# Patient Record
Sex: MALE | Race: WHITE | Employment: FULL TIME | URBAN - METROPOLITAN AREA
[De-identification: names, ages, dates, MRNs, and addresses within clinical notes are randomized per-mention and may not be internally consistent; named-entity substitution may affect disease eponyms.]

---

## 2021-03-09 ENCOUNTER — OFFICE VISIT (OUTPATIENT)
Dept: URGENT CARE | Facility: URGENT CARE | Age: 36
End: 2021-03-09

## 2021-03-09 VITALS
OXYGEN SATURATION: 97 % | WEIGHT: 220 LBS | HEART RATE: 69 BPM | SYSTOLIC BLOOD PRESSURE: 129 MMHG | RESPIRATION RATE: 16 BRPM | DIASTOLIC BLOOD PRESSURE: 85 MMHG | TEMPERATURE: 98.5 F

## 2021-03-09 DIAGNOSIS — M25.561 ACUTE PAIN OF RIGHT KNEE: Primary | ICD-10-CM

## 2021-03-09 PROCEDURE — 99203 OFFICE O/P NEW LOW 30 MIN: CPT | Performed by: PHYSICIAN ASSISTANT

## 2021-03-09 SDOH — HEALTH STABILITY: MENTAL HEALTH: HOW OFTEN DO YOU HAVE A DRINK CONTAINING ALCOHOL?: NEVER

## 2021-03-09 NOTE — PROGRESS NOTES
Patient presents with:  Work Comp: right knee pain- front but worsened in the back of knee. Leg weakness- sx worsened as day goes on. Sx started Feb 27 at work does not recall any injuries job is physical active- pain came on grradually and then worsened with time.       (M25.561) Acute pain of right knee  (primary encounter diagnosis)  Comment:   Plan: ibuprofen as needed.      Follow up with orthopedics should symptoms persist or worsen.       SUBJECTIVE:   Lesa Jarvis is a 35 year old male    Works from 6pm-2am.  [place] discomfort in his right knee while working on 2/26/21.  Pain worse on 2/27/21.  He mentioned this to his manager on 3/2/21.    He does do a lot of lifting at work.        History reviewed. No pertinent past medical history.         Current Outpatient Medications   Medication Sig Dispense Refill     Multiple Vitamins-Iron (DAILY-LISA/IRON/BETA-CAROTENE) TABS TAKE 1 TABLET BY MOUTH DAILY. (Patient not taking: Reported on 10/19/2020) 30 tablet 7     Social History     Tobacco Use     Smoking status: Never Smoker     Smokeless tobacco: Never Used   Substance Use Topics     Alcohol use: Not on file     Family History   Problem Relation Age of Onset     Diabetes Mother      Diabetes Father          ROS:    10 point ROS of systems including Constitutional, Eyes, Respiratory, Cardiovascular, Gastroenterology, Genitourinary, Integumentary, Muscularskeletal, Psychiatric ,neurological were all negative except for pertinent positives noted in my HPI       OBJECTIVE:  /85   Pulse 69   Temp 98.5  F (36.9  C) (Oral)   Resp 16   Wt 99.8 kg (220 lb)   SpO2 97%   Physical Exam:  GENERAL APPEARANCE: healthy, alert and no distress  Extremities: right knee: joint is stable.  Tenderness with palpation - generalized.  No warmth or erythema  SKIN: no suspicious lesions or rashes

## 2021-03-09 NOTE — PATIENT INSTRUCTIONS
Patient Education       (M25.230) Acute pain of right knee  (primary encounter diagnosis)  Comment:   Plan: ibuprofen as needed.        Knee Pain  Knee pain is very common. It s especially common in active people who put a lot of pressure on their knees, like runners. It affects women more often than men.  Your kneecap (patella) is a thick, round bone. It covers and protects the front portion of your knee joint. It moves along a groove in your thighbone (femur) as part of the patellofemoral joint. A layer of cartilage surrounds the underside of your kneecap. This layer protects it from grinding against your femur.  When this cartilage softens and breaks down, it can cause knee pain. This is partly because of repetitive stress. The stress irritates the lining of the joint. This causes pain in the underlying bone.  What causes knee pain?  Many things can cause knee pain. You may have more than one cause. Some of these include:    Overuse of the knee joint    The kneecap doesn t line up with the tissue around it    Damage to small nerves in the area    Damage to the ligament-like structure that holds the kneecap in place (retinaculum)    Breakdown of the bone under the cartilage    Swelling in the soft tissues around the kneecap    Injury  You might be more likely to have knee pain if you:    Exercise a lot    Recently increased the intensity of your workouts    Have a body mass index (BMI) greater than 25    Have poor alignment of your kneecap    Walk with your feet turned overly outward or inward    Have weakness in surrounding muscle groups (inner quad or hip adductor muscles)    Have too much tightness in surrounding muscle groups (hamstrings or iliotibial band)    Have a recent history of injury to the area    Are female  Symptoms of knee pain  This type of knee pain is a dull, aching pain in the front of the knee in the area under and around the kneecap. This pain may start quickly or slowly. Your pain might be  worse when you squat, run, or sit for a long time. Climbing stairs may be painful or hard to do. You might also sometimes feel like your knee is giving out. You may have symptoms in one or both of your knees.  Diagnosing knee pain  Your healthcare provider will ask about your medical history and your symptoms. Be sure to describe any activities that make your knee pain worse. He or she will look at your knee. This will include tests of your range of motion, strength, and areas of pain of your knee. Your knee alignment will be checked.  Your healthcare provider will need to rule out other causes of your knee pain, such as arthritis. You may need an imaging test, such as an X-ray or MRI.  Treatment for knee pain  Treatments that can help ease your symptoms may include:    Avoiding activities for a while that make your pain worse, returning to activity over time    Icing the outside of your knee when it causes you pain    Taking over-the-counter pain medicine such as NSAIDs    Wearing a knee brace or taping your knee to support it    Compression to help prevent swelling    Wearing special shoe inserts to help keep your feet in the proper alignment    Elevating your knee    Doing special exercises to stretch and strengthen the muscles around your hip and your knee  These steps help most people manage knee pain. But some cases of knee pain need to be treated with surgery. You rarely need surgery right away. You may need it later if other treatments don t work. Your healthcare provider may refer you to an orthopedic surgeon. He or she will talk with you about your choices.  Preventing knee pain  Losing weight and correcting excess muscle tightness or muscle weakness may help lower your risk.  In some cases, you can prevent knee pain. To help prevent a flare-up of knee pain, do these things:    Regularly do all the exercises your doctor or physical therapist advises    Warm up fully before exercising    Support your knee  as advised by your doctor or physical therapist    Increase training gradually, and ease up on training when needed    Have an expert check your gait for running or other sporting activities    Stretch properly before and after exercise    Replace your running shoes regularly    Lose excess weight  When to call your healthcare provider  Call your healthcare provider right away if:    Your symptoms don t get better after a few weeks of treatment    You have any new symptoms  Kirk last reviewed this educational content on 6/1/2019 2000-2020 [employer]. All rights reserved. This information is not intended as a substitute for professional medical care. Always follow your healthcare professional's instructions.

## 2021-03-09 NOTE — NURSING NOTE
Vital signs:  Temp: 98.5  F (36.9  C) Temp src: Oral BP: 129/85 Pulse: 69   Resp: 16 SpO2: 97 %       Weight: 99.8 kg (220 lb)  There is no height or weight on file to calculate BMI.

## 2021-03-19 ENCOUNTER — OFFICE VISIT (OUTPATIENT)
Dept: URGENT CARE | Facility: URGENT CARE | Age: 36
End: 2021-03-19

## 2021-03-19 ENCOUNTER — ANCILLARY PROCEDURE (OUTPATIENT)
Dept: GENERAL RADIOLOGY | Facility: CLINIC | Age: 36
End: 2021-03-19
Attending: FAMILY MEDICINE

## 2021-03-19 VITALS
SYSTOLIC BLOOD PRESSURE: 116 MMHG | TEMPERATURE: 97.6 F | OXYGEN SATURATION: 100 % | WEIGHT: 223 LBS | HEART RATE: 58 BPM | BODY MASS INDEX: 31.92 KG/M2 | HEIGHT: 70 IN | DIASTOLIC BLOOD PRESSURE: 74 MMHG

## 2021-03-19 DIAGNOSIS — S83.422A SPRAIN OF LATERAL COLLATERAL LIGAMENT OF LEFT KNEE, INITIAL ENCOUNTER: ICD-10-CM

## 2021-03-19 DIAGNOSIS — Z02.6 ENCOUNTER RELATED TO WORKER'S COMPENSATION CLAIM: ICD-10-CM

## 2021-03-19 DIAGNOSIS — M25.562 ACUTE PAIN OF LEFT KNEE: Primary | ICD-10-CM

## 2021-03-19 PROCEDURE — 73562 X-RAY EXAM OF KNEE 3: CPT | Mod: LT | Performed by: RADIOLOGY

## 2021-03-19 PROCEDURE — 99214 OFFICE O/P EST MOD 30 MIN: CPT | Performed by: FAMILY MEDICINE

## 2021-03-19 ASSESSMENT — MIFFLIN-ST. JEOR: SCORE: 1947.77

## 2021-03-19 NOTE — PROGRESS NOTES
Chief Complaint   Patient presents with     Knee Pain     left-happened yesterday-felt and heard knee pop-pain with movement       Medical Decision Making:    ASSESMENT AND PLAN     Lesa was seen today for knee pain.    Diagnoses and all orders for this visit:    Acute pain of left knee  -     XR Knee Left 3 Views    Encounter related to worker's compensation claim    Sprain of lateral collateral ligament of left knee, initial encounter            Tylenol, Ibuprofen and Rest  Patient was suggested to wear knee brace/knee sleeve as patient already has continue doing light duty activities patient was given a note for his work to continue light duty activities for next 1 week  Suggested if notices any worsening of the pain should follow-up for further evaluation and treatment  Reviewed with patient the x-ray results      Differential Diagnosis:  MS Injury Pain: sprain, fracture, tendonitis, muscle strain, contusion and dislocation      See orders in Epic  Pt verbalized and agreed with the plan and is aware of the worsening symptoms for which would need to follow up .  Pt was stable during time of discharge from the clinic     X-Ray was done, my findings are: no fracture or joint  space narrowing noted     Review of the result(s) of each unique test -     Time  spent on the date of the encounter doing chart review, review of test results, interpretation of tests, patient visit and documentation     If not improving or if condition worsens, follow up with your Primary Care Provider    SUBJECTIVE     Lesa Jarvis is a 35 year old male presenting with a chief complaint of    Chief Complaint   Patient presents with     Knee Pain     left-happened yesterday-felt and heard knee pop-pain with movement     MS Injury/Pain    Onset of symptoms was 1 day(s) ago.while at work he was working with a heavy equipment at work and had to stand on a platform attached to the heavy equipment and had to pull the liver while doing so he had to  "make a twisting movement of the hip and the knee and while doing so felt a pop in the left knee he describes the pain is mild but he has a feeling that the knee tends to get locked when he has this pain.  He denies any history of similar injury in the past.  Location: left knee  Context:       The injury happened while at work      Mechanism: twisting      Patient experienced immediate pain, delayed pain  Course of symptoms is waxing and waning.    Severity moderate  Current and Associated symptoms: Pain, Tenderness, Decreased range of motion and Stiffness  Denies  Warmth and Redness  Aggravating Factors: walking and flexion/extension  Therapies to improve symptoms include: ice  This is the first time this type of problem has occurred for this patient.           History reviewed. No pertinent past medical history.  No current outpatient medications on file.     Social History     Tobacco Use     Smoking status: Never Smoker     Smokeless tobacco: Never Used   Substance Use Topics     Alcohol use: Never     Frequency: Never     History reviewed. No pertinent family history.      ROS:    10 point ROS of systems including Constitutional, Eyes, Respiratory, Cardiovascular, Gastroenterology, Genitourinary, Integumentary,  Psychiatric ,neurological were all negative except for pertinent positives noted in my HPI         OBJECTIVE:    /74   Pulse 58   Temp 97.6  F (36.4  C) (Tympanic)   Ht 1.778 m (5' 10\")   Wt 101.2 kg (223 lb)   SpO2 100%   BMI 32.00 kg/m    GENERAL APPEARANCE: healthy, alert and no distress  EYES: EOMI,  PERRL, conjunctiva clear  NEURO: Normal strength and tone, sensory exam grossly normal,  normal speech and mentation  PSYCH: mentation appears normal  KNEE: The injured knee reveals soft tissue tenderness over lateral collateral ligament, reduced range of motion, normal contralateral knee exam. X-ray is negative for fracture.    Physical Exam      (Note was completed, in part, with Mor[employer] " voice-recognition software. Documentation reviewed, but some grammatical, spelling, and word errors may remain.)  Ariane Jett MD

## 2021-03-19 NOTE — PATIENT INSTRUCTIONS
Patient Education     Knee Sprain of the Collateral Ligaments  The knee is a hinge joint supported by four strong ligaments. The two ligaments inside the knee protect this joint from excess forward and backward movement. The ligaments on the outside of the joint prevent side-to-side motion. These are called the collateral ligaments.  The medial collateral ligament is located on the inner side of the joint; and the lateral collateral ligament is on the outer side of the joint.  A sprain is a tearing of a ligament. The tear may be partial or complete. Diagnosis is made by physical exam. In the case of an acute injury, the knee may be too swollen or painful to examine fully. A more accurate exam can be done after the initial swelling goes down.  Symptoms of a knee sprain include immediate knee swelling, pain, and difficulty walking. Initial treatment includes rest, splinting the knee to reduce movement of the joint, and icing the knee to reduce swelling and pain. You may use over-the-counter pain medicine to control pain, unless another medicine was prescribed. Most sprains will heal in 3 to 6 weeks. A severe injury can take 3 to 4 months to heal. You may also need rehab exercises. Surgery is usually not required for sprains involving only the collateral ligaments.  Home care    Stay off the injured leg as much as possible until you can walk on it without pain. If you have a lot of pain while walking, crutches or a walker may be prescribed. These can be rented or purchased at many pharmacies and surgical or orthopedic supply stores. Follow your healthcare provider s advice about when to begin bearing weight on that leg.     If you were given a hook-and-loop closure knee brace, you can remove it to bathe. But leave it in place when walking, sitting, or lying down unless told otherwise.    Apply an ice pack over the injured area for 15 to 20 minutes every 3 to 6 hours. You should do this for the first 24 to 48  hours. You can make an ice pack by filling a plastic bag that seals at the top with ice cubes and then wrapping it with a thin towel. Continue to use ice packs for relief of pain and swelling as needed. As the ice melts, be careful to avoid getting your wrap, splint, or cast wet. You can place the ice pack directly over the splint. If you have to wear a hook-and-loop knee brace, you can open it to apply the ice pack, or heat, directly to the knee. Never put ice directly on the skin. Always wrap the ice in a towel or other type of cloth.    You may use over-the-counter pain medicine to control pain, unless another pain medicine was prescribed. Anti-inflammatory pain medicines, such as ibuprofen or naproxen may be more effective than acetaminophen. If you have chronic liver or kidney disease or ever had a stomach ulcer or gastrointestinal bleeding, talk with your healthcare provider before using these medicines.  Follow-up care  Follow up with your healthcare provider as advised. Any X-rays you had today don t show any broken bones, breaks, or fractures. Sometimes fractures don t show up on the first X-ray. Bruises and sprains can sometimes hurt as much as a fracture. These injuries can take time to heal completely. If your symptoms don t improve or they get worse, talk with your healthcare provider. You may need a repeat X-ray or other tests such as MRI. If X-rays were taken, you will be told of any new findings that may affect your care.  Call 911  Call 911 if you have:     Shortness of breath     Chest pain  When to seek medical advice  Call your healthcare provider right away if any of these occur:    Pain or swelling increases    Swelling, redness, or pain in the calf or thigh  [employer] last reviewed this educational content on 5/1/2018 2000-2020 [employer]. All rights reserved. This information is not intended as a substitute for professional medical care. Always follow your healthcare  professional's instructions.

## 2021-03-19 NOTE — LETTER
[facility]  [address] [address]  [phone]      March 19, 2021    RE:  Lesa Jarvis                                                                                                                                                       [address]   [facility] [address]            To whom it may concern:    Lesa Jarvis is under my professional care for Acute pain of left knee.   He  may return to work with the following: Light duty-unable to bend, stoop or twist on or about for a week .          Sincerely,        Ariane Jett MD    [facility]

## 2021-06-09 ENCOUNTER — OFFICE VISIT (OUTPATIENT)
Dept: URGENT CARE | Facility: URGENT CARE | Age: 36
End: 2021-06-09

## 2021-06-09 VITALS — DIASTOLIC BLOOD PRESSURE: 83 MMHG | HEART RATE: 64 BPM | SYSTOLIC BLOOD PRESSURE: 143 MMHG | TEMPERATURE: 98.6 F

## 2021-06-09 DIAGNOSIS — M77.8 TENDONITIS OF WRIST, LEFT: Primary | ICD-10-CM

## 2021-06-09 PROCEDURE — 99214 OFFICE O/P EST MOD 30 MIN: CPT | Performed by: PHYSICIAN ASSISTANT

## 2021-06-09 RX ORDER — IBUPROFEN 800 MG/1
800 TABLET, FILM COATED ORAL EVERY 8 HOURS PRN
Qty: 21 TABLET | Refills: 0 | Status: SHIPPED | OUTPATIENT
Start: 2021-06-09 | End: 2021-06-16

## 2021-06-09 NOTE — PATIENT INSTRUCTIONS
1 week light duty  Follow up with sports medicine in 1 week if not fully resolved for continued care plan      Patient Education     Tendonitis  A tendon is the thick fibrous cord that joins muscle to bone and allows joints to move. When a tendon becomes inflamed, it is called tendonitis. This can occur from overuse, injury, or infection. This usually involves the shoulders, forearm, wrist, hands and feet. Symptoms include pain, swelling and tenderness to the touch. Moving the joint increases the pain.  It takes 4 to 6 weeks or more for tendonitis to heal. It is treated by preventing motion of the tendon, occasionally with a splint or brace, and the use of anti-inflammatory medicine.  Home care    Some people find relief with ice packs. These can be crushed or cubed ice in a plastic bag or a bag of frozen vegetables wrapped in a thin towel. Other people get better relief with heat. This can include a hot shower, hot bath, or a moist towel warmed in a microwave. Try each and use the method that feels best, for 15 to 20 minutes several times a day.    Rest the inflamed joint and protect it from movement.    You may use over-the-counter ibuprofen or naproxen to treat pain and inflammation, unless another medicine was prescribed. If you can't take these medicines, acetaminophen may help with the pain, but does not treat inflammation. If you have chronic liver or kidney disease or ever had a stomach ulcer or gastrointestinal bleeding, talk with your doctor before using these medicines.    As your symptoms improve, begin gradual motion at the involved joint.  Follow-up care  Follow up with your healthcare provider if you are not improving after 5 to 7 days of treatment.  When to seek medical advice  Call your healthcare provider right away if any of these occur:    Redness over the painful area    Increasing pain or swelling at the joint    Fever lasting 24 to 48 hours or chills, or as advised by your healthcare  pablo Bradley last reviewed this educational content on 5/1/2018 2000-2021 [employer]. All rights reserved. This information is not intended as a substitute for professional medical care. Always follow your healthcare professional's instructions.

## 2021-06-09 NOTE — LETTER
[facility] [place]  [address] [address]  Phone: [phone]    June 9, 2021        Lesa Jarvis  [address]   [facility] [address]          To whom it may concern:    RE: Lesa Jarvis    Patient was seen and treated today at our clinic.  I have recommended Mr. Jarvis not use his left arm and hand for 1 week.  If pain persists he will need to follow up with sports medicine for further care.    Please contact me for questions or concerns.      Sincerely,        Markel Sloan PA-C

## 2021-06-09 NOTE — PROGRESS NOTES
Jose  8pm at [facility] [employer]  Lifting 24 pack of water bottles, hyperextending his left thumb    No histoyr of injuries to this extremity    Pain continues, worsens.        SUBJECTIVE:  Chief Complaint   Patient presents with     Urgent Care     sharp left thumb pain/injury - reports he jammed thumb on a plastic item last night at work.      Work Comp     Lesa Jarvis is a 36 year old male presents with a chief complaint of left finger  first pain.  The injury occurred 1 day(s) ago.   The injury happened while at work. How: as aboveimmediate pain.  The patient complained of moderate pain  and has not had decreased ROM.  Pain exacerbated by weight-bearing and movement.  Relieved by rest.      No past medical history on file.  No current outpatient medications on file.     Social History     Tobacco Use     Smoking status: Never Smoker     Smokeless tobacco: Never Used   Substance Use Topics     Alcohol use: Never     Frequency: Never       ROS:  ROS negative except as listed above      EXAM:   BP (!) 143/83   Pulse 64   Temp 98.6  F (37  C) (Tympanic)   Gen: healthy,alert,no distress  Extremity: hand has point tenderness and pain with finklestein maneuver.   There is not compromise to the distal circulation.  Pulses are +2 and CRT is brisk  CHEST: clear to auscultation  CV: regular rate and rhythm  EXTREMITIES: peripheral pulses normal  MS: no gross deformities noted, no evidence of inflammation in joints, FROM in all extremities.  SKIN: no suspicious lesions or rashes  NEURO: Normal strength and tone, sensory exam grossly normal, mentation intact and speech normal      ASSESSMENT:   (M77.8) Tendonitis of wrist, left  (primary encounter diagnosis)  Plan: Wrist/Arm/Hand Supplies Order for DME - ONLY         FOR DME, Orthopedic & Spine  Referral,        ibuprofen (ADVIL/MOTRIN) 800 MG tablet        Patient Instructions   1 week light duty  Follow up with sports medicine in 1 week if not fully resolved  for continued care plan      Patient Education     Tendonitis  A tendon is the thick fibrous cord that joins muscle to bone and allows joints to move. When a tendon becomes inflamed, it is called tendonitis. This can occur from overuse, injury, or infection. This usually involves the shoulders, forearm, wrist, hands and feet. Symptoms include pain, swelling and tenderness to the touch. Moving the joint increases the pain.  It takes 4 to 6 weeks or more for tendonitis to heal. It is treated by preventing motion of the tendon, occasionally with a splint or brace, and the use of anti-inflammatory medicine.  Home care    Some people find relief with ice packs. These can be crushed or cubed ice in a plastic bag or a bag of frozen vegetables wrapped in a thin towel. Other people get better relief with heat. This can include a hot shower, hot bath, or a moist towel warmed in a microwave. Try each and use the method that feels best, for 15 to 20 minutes several times a day.    Rest the inflamed joint and protect it from movement.    You may use over-the-counter ibuprofen or naproxen to treat pain and inflammation, unless another medicine was prescribed. If you can't take these medicines, acetaminophen may help with the pain, but does not treat inflammation. If you have chronic liver or kidney disease or ever had a stomach ulcer or gastrointestinal bleeding, talk with your doctor before using these medicines.    As your symptoms improve, begin gradual motion at the involved joint.  Follow-up care  Follow up with your healthcare provider if you are not improving after 5 to 7 days of treatment.  When to seek medical advice  Call your healthcare provider right away if any of these occur:    Redness over the painful area    Increasing pain or swelling at the joint    Fever lasting 24 to 48 hours or chills, or as advised by your healthcare provider  Kirk last reviewed this educational content on 5/1/2018 2000-2021 [employer]  [employer]. All rights reserved. This information is not intended as a substitute for professional medical care. Always follow your healthcare professional's instructions.

## 2021-06-14 ENCOUNTER — TELEPHONE (OUTPATIENT)
Dept: PEDIATRICS | Facility: CLINIC | Age: 36
End: 2021-06-14

## 2021-06-14 NOTE — TELEPHONE ENCOUNTER
Patient came to  urgent care today for another note for work comp to take an additional week off from work. Are you able to write this note for patient since he cannot be seen in urgent care for a work comp follow up. Please advise.    Ilene De Anda CMA on 6/14/2021 at 2:14 PM